# Patient Record
Sex: FEMALE | Race: WHITE | NOT HISPANIC OR LATINO | URBAN - METROPOLITAN AREA
[De-identification: names, ages, dates, MRNs, and addresses within clinical notes are randomized per-mention and may not be internally consistent; named-entity substitution may affect disease eponyms.]

---

## 2017-08-02 ENCOUNTER — INPATIENT (INPATIENT)
Facility: HOSPITAL | Age: 52
LOS: 1 days | Discharge: HOME | End: 2017-08-04
Attending: HOSPITALIST

## 2017-08-02 DIAGNOSIS — L03.119 CELLULITIS OF UNSPECIFIED PART OF LIMB: ICD-10-CM

## 2017-08-09 DIAGNOSIS — L03.115 CELLULITIS OF RIGHT LOWER LIMB: ICD-10-CM

## 2017-08-09 DIAGNOSIS — E87.6 HYPOKALEMIA: ICD-10-CM

## 2017-08-29 PROBLEM — Z00.00 ENCOUNTER FOR PREVENTIVE HEALTH EXAMINATION: Status: ACTIVE | Noted: 2017-08-29

## 2018-09-29 ENCOUNTER — EMERGENCY (EMERGENCY)
Facility: HOSPITAL | Age: 53
LOS: 0 days | Discharge: HOME | End: 2018-09-29
Attending: EMERGENCY MEDICINE | Admitting: EMERGENCY MEDICINE

## 2018-09-29 VITALS
SYSTOLIC BLOOD PRESSURE: 139 MMHG | HEART RATE: 77 BPM | RESPIRATION RATE: 16 BRPM | WEIGHT: 225.97 LBS | OXYGEN SATURATION: 97 % | HEIGHT: 62 IN | TEMPERATURE: 97 F | DIASTOLIC BLOOD PRESSURE: 87 MMHG

## 2018-09-29 DIAGNOSIS — Z98.891 HISTORY OF UTERINE SCAR FROM PREVIOUS SURGERY: ICD-10-CM

## 2018-09-29 DIAGNOSIS — Z98.891 HISTORY OF UTERINE SCAR FROM PREVIOUS SURGERY: Chronic | ICD-10-CM

## 2018-09-29 DIAGNOSIS — R04.0 EPISTAXIS: ICD-10-CM

## 2018-09-29 DIAGNOSIS — Z98.890 OTHER SPECIFIED POSTPROCEDURAL STATES: ICD-10-CM

## 2018-09-29 DIAGNOSIS — Z98.84 BARIATRIC SURGERY STATUS: Chronic | ICD-10-CM

## 2018-09-29 RX ORDER — PHENYLEPHRINE HCL 0.25 %
1 AEROSOL, SPRAY WITH PUMP (ML) NASAL ONCE
Qty: 0 | Refills: 0 | Status: COMPLETED | OUTPATIENT
Start: 2018-09-29 | End: 2018-09-29

## 2018-09-29 RX ADMIN — Medication 1 SPRAY(S): at 19:10

## 2018-09-29 NOTE — ED PROVIDER NOTE - PHYSICAL EXAMINATION
Physical Exam    Vital Signs: I have reviewed the initial vital signs.  Constitutional: well-nourished, appears stated age, no acute distress  Eyes: PERRLA, EOM intact, no conjunctival injection, and symmetrical lids.  ENT: Neck supple with no adenopathy, moist MM. + Bleeding from L. nostril oozing with no blood in posterior pharynx.  Cardiovascular: regular rate, regular rhythm, well-perfused extremities  Respiratory: unlabored respiratory effort, clear to auscultation bilaterally  Integumentary: warm, dry, no rash

## 2018-09-29 NOTE — ED PROVIDER NOTE - PROGRESS NOTE DETAILS
Cauterized bleeding vessel in left nostril Bleeding controlled no bleeding seen in L nostril on exam. Patient to be discharged from ED. Any available test results were discussed with patient and/or family. Verbal instructions given, including instructions to return to ED immediately for any new, worsening, or concerning symptoms. Patient endorsed understanding. Written discharge instructions additionally given, including follow-up plan.

## 2018-09-29 NOTE — ED PROVIDER NOTE - NS ED ROS FT
Review of Systems    Constitutional: (-) fever (-) weakness (-) diaphoresis   Eyes: (-) change in vision (-) photophobia (-) eye pain  ENT: (-) sore throat (-) ear ache  Cardiovascular: (-) chest pain  (-) palpitations  Respiratory: (-) SOB (-) cough   GI: (-) N/V   Integumentary: (-) rash (-) redness

## 2018-09-29 NOTE — ED PROVIDER NOTE - ATTENDING CONTRIBUTION TO CARE
left nostril bleeding, hx of nose bleeds, with cautery in past, no hx of trauma, bleeding started after blowing nose, there is constnat dripping, left nostril has fresh blood, clots removed, will use afrin spary and reassess nose to evaluate for cautery.

## 2018-09-29 NOTE — ED PROVIDER NOTE - OBJECTIVE STATEMENT
54 yo f reports w/ a h/o nose bleeds reports a nose bleed that began after pt sneezed 2 hr prior to arrival. Pt usually goes to ENT for cautery but today office was closed. Denies CP, palpitations, SOB, lightheadedness, trauma.     I have reviewed available current nursing and previous documentation of past medical, surgical, family, and/or social history.

## 2019-06-25 ENCOUNTER — INPATIENT (INPATIENT)
Facility: HOSPITAL | Age: 54
LOS: 0 days | Discharge: HOME | End: 2019-06-26
Attending: HOSPITALIST | Admitting: HOSPITALIST
Payer: COMMERCIAL

## 2019-06-25 VITALS
SYSTOLIC BLOOD PRESSURE: 127 MMHG | TEMPERATURE: 98 F | WEIGHT: 214.95 LBS | OXYGEN SATURATION: 98 % | DIASTOLIC BLOOD PRESSURE: 78 MMHG | HEART RATE: 105 BPM | RESPIRATION RATE: 105 BRPM

## 2019-06-25 DIAGNOSIS — Z98.891 HISTORY OF UTERINE SCAR FROM PREVIOUS SURGERY: Chronic | ICD-10-CM

## 2019-06-25 DIAGNOSIS — R00.2 PALPITATIONS: ICD-10-CM

## 2019-06-25 DIAGNOSIS — R07.9 CHEST PAIN, UNSPECIFIED: ICD-10-CM

## 2019-06-25 DIAGNOSIS — R01.1 CARDIAC MURMUR, UNSPECIFIED: ICD-10-CM

## 2019-06-25 DIAGNOSIS — E66.9 OBESITY, UNSPECIFIED: ICD-10-CM

## 2019-06-25 DIAGNOSIS — D64.9 ANEMIA, UNSPECIFIED: ICD-10-CM

## 2019-06-25 DIAGNOSIS — F41.9 ANXIETY DISORDER, UNSPECIFIED: ICD-10-CM

## 2019-06-25 DIAGNOSIS — Z98.84 BARIATRIC SURGERY STATUS: Chronic | ICD-10-CM

## 2019-06-25 LAB
ALBUMIN SERPL ELPH-MCNC: 3.9 G/DL — SIGNIFICANT CHANGE UP (ref 3.5–5.2)
ALBUMIN SERPL ELPH-MCNC: 4.1 G/DL — SIGNIFICANT CHANGE UP (ref 3.5–5.2)
ALP SERPL-CCNC: 104 U/L — SIGNIFICANT CHANGE UP (ref 30–115)
ALP SERPL-CCNC: 113 U/L — SIGNIFICANT CHANGE UP (ref 30–115)
ALT FLD-CCNC: 11 U/L — SIGNIFICANT CHANGE UP (ref 0–41)
ALT FLD-CCNC: 12 U/L — SIGNIFICANT CHANGE UP (ref 0–41)
ANION GAP SERPL CALC-SCNC: 13 MMOL/L — SIGNIFICANT CHANGE UP (ref 7–14)
ANION GAP SERPL CALC-SCNC: 13 MMOL/L — SIGNIFICANT CHANGE UP (ref 7–14)
AST SERPL-CCNC: 16 U/L — SIGNIFICANT CHANGE UP (ref 0–41)
AST SERPL-CCNC: 18 U/L — SIGNIFICANT CHANGE UP (ref 0–41)
BASOPHILS # BLD AUTO: 0.01 K/UL — SIGNIFICANT CHANGE UP (ref 0–0.2)
BASOPHILS NFR BLD AUTO: 0.2 % — SIGNIFICANT CHANGE UP (ref 0–1)
BILIRUB SERPL-MCNC: 0.5 MG/DL — SIGNIFICANT CHANGE UP (ref 0.2–1.2)
BILIRUB SERPL-MCNC: 0.6 MG/DL — SIGNIFICANT CHANGE UP (ref 0.2–1.2)
BLD GP AB SCN SERPL QL: SIGNIFICANT CHANGE UP
BUN SERPL-MCNC: 14 MG/DL — SIGNIFICANT CHANGE UP (ref 10–20)
BUN SERPL-MCNC: 14 MG/DL — SIGNIFICANT CHANGE UP (ref 10–20)
CALCIUM SERPL-MCNC: 9.1 MG/DL — SIGNIFICANT CHANGE UP (ref 8.5–10.1)
CALCIUM SERPL-MCNC: 9.3 MG/DL — SIGNIFICANT CHANGE UP (ref 8.5–10.1)
CHLORIDE SERPL-SCNC: 102 MMOL/L — SIGNIFICANT CHANGE UP (ref 98–110)
CHLORIDE SERPL-SCNC: 103 MMOL/L — SIGNIFICANT CHANGE UP (ref 98–110)
CK MB CFR SERPL CALC: 1.8 NG/ML — SIGNIFICANT CHANGE UP (ref 0.6–6.3)
CK MB CFR SERPL CALC: 2.1 NG/ML — SIGNIFICANT CHANGE UP (ref 0.6–6.3)
CK SERPL-CCNC: 38 U/L — SIGNIFICANT CHANGE UP (ref 0–225)
CK SERPL-CCNC: 41 U/L — SIGNIFICANT CHANGE UP (ref 0–225)
CO2 SERPL-SCNC: 25 MMOL/L — SIGNIFICANT CHANGE UP (ref 17–32)
CO2 SERPL-SCNC: 26 MMOL/L — SIGNIFICANT CHANGE UP (ref 17–32)
CREAT SERPL-MCNC: 0.5 MG/DL — LOW (ref 0.7–1.5)
CREAT SERPL-MCNC: 0.5 MG/DL — LOW (ref 0.7–1.5)
EOSINOPHIL # BLD AUTO: 0.06 K/UL — SIGNIFICANT CHANGE UP (ref 0–0.7)
EOSINOPHIL NFR BLD AUTO: 1.1 % — SIGNIFICANT CHANGE UP (ref 0–8)
GLUCOSE SERPL-MCNC: 87 MG/DL — SIGNIFICANT CHANGE UP (ref 70–99)
GLUCOSE SERPL-MCNC: 98 MG/DL — SIGNIFICANT CHANGE UP (ref 70–99)
HCT VFR BLD CALC: 25.9 % — LOW (ref 37–47)
HCT VFR BLD CALC: 40.7 % — SIGNIFICANT CHANGE UP (ref 37–47)
HCT VFR BLD CALC: 42.8 % — SIGNIFICANT CHANGE UP (ref 37–47)
HGB BLD-MCNC: 13.5 G/DL — SIGNIFICANT CHANGE UP (ref 12–16)
HGB BLD-MCNC: 13.8 G/DL — SIGNIFICANT CHANGE UP (ref 12–16)
HGB BLD-MCNC: 8.6 G/DL — LOW (ref 12–16)
IMM GRANULOCYTES NFR BLD AUTO: 0.2 % — SIGNIFICANT CHANGE UP (ref 0.1–0.3)
IRON SATN MFR SERPL: 23 % — SIGNIFICANT CHANGE UP (ref 15–50)
IRON SATN MFR SERPL: 83 UG/DL — SIGNIFICANT CHANGE UP (ref 35–150)
LYMPHOCYTES # BLD AUTO: 1.51 K/UL — SIGNIFICANT CHANGE UP (ref 1.2–3.4)
LYMPHOCYTES # BLD AUTO: 28.1 % — SIGNIFICANT CHANGE UP (ref 20.5–51.1)
MCHC RBC-ENTMCNC: 28.8 PG — SIGNIFICANT CHANGE UP (ref 27–31)
MCHC RBC-ENTMCNC: 29.7 PG — SIGNIFICANT CHANGE UP (ref 27–31)
MCHC RBC-ENTMCNC: 32.2 G/DL — SIGNIFICANT CHANGE UP (ref 32–37)
MCHC RBC-ENTMCNC: 33.2 G/DL — SIGNIFICANT CHANGE UP (ref 32–37)
MCV RBC AUTO: 89.2 FL — SIGNIFICANT CHANGE UP (ref 81–99)
MCV RBC AUTO: 89.3 FL — SIGNIFICANT CHANGE UP (ref 81–99)
MONOCYTES # BLD AUTO: 0.27 K/UL — SIGNIFICANT CHANGE UP (ref 0.1–0.6)
MONOCYTES NFR BLD AUTO: 5 % — SIGNIFICANT CHANGE UP (ref 1.7–9.3)
NEUTROPHILS # BLD AUTO: 3.51 K/UL — SIGNIFICANT CHANGE UP (ref 1.4–6.5)
NEUTROPHILS NFR BLD AUTO: 65.4 % — SIGNIFICANT CHANGE UP (ref 42.2–75.2)
NRBC # BLD: 0 /100 WBCS — SIGNIFICANT CHANGE UP (ref 0–0)
NRBC # BLD: 0 /100 WBCS — SIGNIFICANT CHANGE UP (ref 0–0)
PLATELET # BLD AUTO: 166 K/UL — SIGNIFICANT CHANGE UP (ref 130–400)
PLATELET # BLD AUTO: 275 K/UL — SIGNIFICANT CHANGE UP (ref 130–400)
POTASSIUM SERPL-MCNC: 3.5 MMOL/L — SIGNIFICANT CHANGE UP (ref 3.5–5)
POTASSIUM SERPL-MCNC: 4.1 MMOL/L — SIGNIFICANT CHANGE UP (ref 3.5–5)
POTASSIUM SERPL-SCNC: 3.5 MMOL/L — SIGNIFICANT CHANGE UP (ref 3.5–5)
POTASSIUM SERPL-SCNC: 4.1 MMOL/L — SIGNIFICANT CHANGE UP (ref 3.5–5)
PROT SERPL-MCNC: 6.1 G/DL — SIGNIFICANT CHANGE UP (ref 6–8)
PROT SERPL-MCNC: 6.5 G/DL — SIGNIFICANT CHANGE UP (ref 6–8)
RBC # BLD: 2.9 M/UL — LOW (ref 4.2–5.4)
RBC # BLD: 4.8 M/UL — SIGNIFICANT CHANGE UP (ref 4.2–5.4)
RBC # BLD: 4.8 M/UL — SIGNIFICANT CHANGE UP (ref 4.2–5.4)
RBC # FLD: 13.4 % — SIGNIFICANT CHANGE UP (ref 11.5–14.5)
RBC # FLD: 13.7 % — SIGNIFICANT CHANGE UP (ref 11.5–14.5)
RETICS #: 67.2 K/UL — SIGNIFICANT CHANGE UP (ref 25–125)
RETICS/RBC NFR: 1.4 % — SIGNIFICANT CHANGE UP (ref 0.5–1.5)
SODIUM SERPL-SCNC: 141 MMOL/L — SIGNIFICANT CHANGE UP (ref 135–146)
SODIUM SERPL-SCNC: 141 MMOL/L — SIGNIFICANT CHANGE UP (ref 135–146)
TIBC SERPL-MCNC: 355 UG/DL — SIGNIFICANT CHANGE UP (ref 220–430)
TROPONIN T SERPL-MCNC: <0.01 NG/ML — SIGNIFICANT CHANGE UP
UIBC SERPL-MCNC: 272 UG/DL — SIGNIFICANT CHANGE UP (ref 110–370)
WBC # BLD: 5.37 K/UL — SIGNIFICANT CHANGE UP (ref 4.8–10.8)
WBC # BLD: 9.06 K/UL — SIGNIFICANT CHANGE UP (ref 4.8–10.8)
WBC # FLD AUTO: 5.37 K/UL — SIGNIFICANT CHANGE UP (ref 4.8–10.8)
WBC # FLD AUTO: 9.06 K/UL — SIGNIFICANT CHANGE UP (ref 4.8–10.8)

## 2019-06-25 PROCEDURE — 71046 X-RAY EXAM CHEST 2 VIEWS: CPT | Mod: 26

## 2019-06-25 PROCEDURE — 99285 EMERGENCY DEPT VISIT HI MDM: CPT | Mod: 25

## 2019-06-25 PROCEDURE — 93970 EXTREMITY STUDY: CPT | Mod: 26

## 2019-06-25 PROCEDURE — 99223 1ST HOSP IP/OBS HIGH 75: CPT | Mod: AI

## 2019-06-25 RX ORDER — NYSTATIN CREAM 100000 [USP'U]/G
1 CREAM TOPICAL
Refills: 0 | Status: DISCONTINUED | OUTPATIENT
Start: 2019-06-25 | End: 2019-06-26

## 2019-06-25 RX ORDER — SERTRALINE 25 MG/1
25 TABLET, FILM COATED ORAL DAILY
Refills: 0 | Status: DISCONTINUED | OUTPATIENT
Start: 2019-06-25 | End: 2019-06-25

## 2019-06-25 RX ORDER — HYDROXYZINE HCL 10 MG
25 TABLET ORAL AT BEDTIME
Refills: 0 | Status: DISCONTINUED | OUTPATIENT
Start: 2019-06-25 | End: 2019-06-26

## 2019-06-25 RX ORDER — SERTRALINE 25 MG/1
50 TABLET, FILM COATED ORAL
Qty: 0 | Refills: 0 | DISCHARGE

## 2019-06-25 RX ORDER — SERTRALINE 25 MG/1
0 TABLET, FILM COATED ORAL
Qty: 0 | Refills: 0 | DISCHARGE

## 2019-06-25 RX ADMIN — Medication 25 MILLIGRAM(S): at 21:25

## 2019-06-25 RX ADMIN — NYSTATIN CREAM 1 APPLICATION(S): 100000 CREAM TOPICAL at 17:35

## 2019-06-25 NOTE — H&P ADULT - NSHPLABSRESULTS_GEN_ALL_CORE
8.6    5.37  )-----------( 166      ( 25 Jun 2019 04:30 )             25.9     06-25    141  |  103  |  14  ----------------------------<  98  3.5   |  25  |  0.5<L>    Ca    9.1      25 Jun 2019 04:30    TPro  6.1  /  Alb  3.9  /  TBili  0.6  /  DBili  x   /  AST  16  /  ALT  11  /  AlkPhos  104  06-25              Lactate Trend    CARDIAC MARKERS ( 25 Jun 2019 03:55 )  x     / <0.01 ng/mL / x     / x     / x          CAPILLARY BLOOD GLUCOSE 8.6    5.37  )-----------( 166      ( 25 Jun 2019 04:30 )             25.9     06-25    141  |  103  |  14  ----------------------------<  98  3.5   |  25  |  0.5<L>    Ca    9.1      25 Jun 2019 04:30    TPro  6.1  /  Alb  3.9  /  TBili  0.6  /  DBili  x   /  AST  16  /  ALT  11  /  AlkPhos  104  06-25              Lactate Trend    CARDIAC MARKERS ( 25 Jun 2019 03:55 )  x     / <0.01 ng/mL / x     / x     / x          EKG - NSR, POSSIBLE LAE, LVH

## 2019-06-25 NOTE — H&P ADULT - PROBLEM SELECTOR PLAN 1
Due to the fact that this is second hospitalization within a few days for same issue, patient is informed Due to the fact that this is second hospitalization within a few days for same issue (and mentions to both ER staff and myself that her troponins were elevated at Health system,  patient is informed that at least 24 hours of observation will be needed. For now cardiac monitor, serial cardiac enzymes and Echo

## 2019-06-25 NOTE — H&P ADULT - HISTORY OF PRESENT ILLNESS
54yo female presents to the ER due to palpitations which awoke her from sleep. Associated with a "faint" pain under left breast. She denies fevers, cough or shortness of breath. Actually has had similar episodes for several months but has gotten worse over last 3 days. She was admitted for this problem in [facility] and discharged yesterday after series of 3 negative cardiac enzymes and a normal thyroid level (as per patient) She was told they could not do an ECHO however due to scheduling problem. Patient denies alcohol or tobacco use but notes there is lot of stress at home. She has a murmur 52yo female presents to the ER due to palpitations which awoke her from sleep. Associated with a "faint" pain under left breast. She denies fevers, cough or shortness of breath. Actually has had similar episodes for several months but has gotten worse over last 3 days. She was admitted for this problem in [facility] and discharged yesterday after series of 3 "elevated" cardiac enzyme levels but a normal thyroid level (as per patient) She was told they could not do an ECHO however due to scheduling problem. Patient denies alcohol or tobacco use but notes there is lot of stress at home. She has history of murmur but she denies any history of anemia (says she was not told of being anemic in [place] 54yo female presents to the ER due to palpitations which awoke her from sleep. Associated with a "faint" pain under left breast. She denies fevers, cough or shortness of breath. Actually has had similar episodes for several months but has gotten worse over last 3 days. She was admitted for this problem in [facility] and discharged yesterday after series of 3 "elevated" cardiac enzyme levels but a normal thyroid level (as per patient) She was told they could not do an ECHO however due to scheduling problem. Patient denies alcohol or tobacco use but notes there is lot of stress at home. She has history of murmur but she denies any history of anemia (says she was not told of being anemic in [place]. She stilll has her period but denies heavy bleeding or blood per rectum or urine 54yo female presents to the ER due to palpitations which awoke her from sleep. Associated with a "faint" pain under left breast. She denies fevers, cough or shortness of breath but reports some weakness. Actually has had similar episodes for several months but has gotten worse over last 3 days. She was admitted for this problem in [facility] and discharged yesterday after series of 3 "elevated" cardiac enzyme levels but a normal thyroid level (as per patient) She was told they could not do an ECHO however due to scheduling problem. Patient denies alcohol or tobacco use but notes there is lot of stress at home. She has history of murmur but she denies any history of anemia (says she was not told of being anemic in [place]. She stilll has her period but denies heavy bleeding or blood per rectum or urine

## 2019-06-25 NOTE — H&P ADULT - PROBLEM SELECTOR PLAN 4
As mentioned above, patient not aware of this being present in the past but does not seem acute (no active bleeding). Anemia work up ordered - per patient request release of information form completed by myself to be forwarded to Elis Quinn for CBC (and cardiac) results. I asked Medical PA to forward this request

## 2019-06-25 NOTE — ED PROVIDER NOTE - OBJECTIVE STATEMENT
53 year old female comes to emergency room for chest pain and palpitations. patient states that she was under a lot of stress over the last week and was in [place] visiting family and was having chest pain and was seen at [facility] and was admitted for elevated troponin. patient states she was suppose to get ECHO but was unable to get as in patient and left hospital. patient states tonight had another episode of chest pain and palpations and came to emergency room. denies shortness of breath, leg pain and swelling, no recent travel.

## 2019-06-25 NOTE — PATIENT PROFILE ADULT - ANY IMPAIRED UPPER EXTREMITY FUNCTION WITHIN A WEEK PRIOR TO ADMISSION RELATED TO?
Lisinopril 20 is increased to 40 mg daily    NEW rx for blood pressure  Is hydrochlorothiazide aka HCTZ 25 mg one daily    Watch for light headedness when standing from a sitting or a laying position  
no

## 2019-06-25 NOTE — ED PROVIDER NOTE - CLINICAL SUMMARY MEDICAL DECISION MAKING FREE TEXT BOX
Pt pw palpiations over  few months and  pressure -  no pe risk factors  low heart score - pt says was in a [facility] yesterday had positiive "troponins" -  they were unable to get echo and sent home -  Pt with recurrence of palpitations.  trop negative here  ekg no ischemia - pt with  + murmur on exam  .  admitted for further eval

## 2019-06-25 NOTE — ED ADULT TRIAGE NOTE - CHIEF COMPLAINT QUOTE
chest pain over the past few months, pt indicates they were in [facility] yesterday and admitted for elevated trops x 3.

## 2019-06-26 ENCOUNTER — TRANSCRIPTION ENCOUNTER (OUTPATIENT)
Age: 54
End: 2019-06-26

## 2019-06-26 VITALS
SYSTOLIC BLOOD PRESSURE: 98 MMHG | TEMPERATURE: 98 F | RESPIRATION RATE: 16 BRPM | HEART RATE: 84 BPM | DIASTOLIC BLOOD PRESSURE: 57 MMHG

## 2019-06-26 LAB
ANION GAP SERPL CALC-SCNC: 10 MMOL/L — SIGNIFICANT CHANGE UP (ref 7–14)
BUN SERPL-MCNC: 13 MG/DL — SIGNIFICANT CHANGE UP (ref 10–20)
CALCIUM SERPL-MCNC: 8.9 MG/DL — SIGNIFICANT CHANGE UP (ref 8.5–10.1)
CHLORIDE SERPL-SCNC: 104 MMOL/L — SIGNIFICANT CHANGE UP (ref 98–110)
CO2 SERPL-SCNC: 27 MMOL/L — SIGNIFICANT CHANGE UP (ref 17–32)
CREAT SERPL-MCNC: <0.5 MG/DL — LOW (ref 0.7–1.5)
FERRITIN SERPL-MCNC: 43 NG/ML — SIGNIFICANT CHANGE UP (ref 15–150)
FOLATE SERPL-MCNC: >20 NG/ML — SIGNIFICANT CHANGE UP
GLUCOSE SERPL-MCNC: 83 MG/DL — SIGNIFICANT CHANGE UP (ref 70–99)
HCT VFR BLD CALC: 40.3 % — SIGNIFICANT CHANGE UP (ref 37–47)
HCV AB S/CO SERPL IA: 0.07 S/CO — SIGNIFICANT CHANGE UP (ref 0–0.99)
HCV AB SERPL-IMP: SIGNIFICANT CHANGE UP
HGB BLD-MCNC: 13.2 G/DL — SIGNIFICANT CHANGE UP (ref 12–16)
MCHC RBC-ENTMCNC: 29.1 PG — SIGNIFICANT CHANGE UP (ref 27–31)
MCHC RBC-ENTMCNC: 32.8 G/DL — SIGNIFICANT CHANGE UP (ref 32–37)
MCV RBC AUTO: 88.8 FL — SIGNIFICANT CHANGE UP (ref 81–99)
NRBC # BLD: 0 /100 WBCS — SIGNIFICANT CHANGE UP (ref 0–0)
PLATELET # BLD AUTO: 240 K/UL — SIGNIFICANT CHANGE UP (ref 130–400)
POTASSIUM SERPL-MCNC: 3.9 MMOL/L — SIGNIFICANT CHANGE UP (ref 3.5–5)
POTASSIUM SERPL-SCNC: 3.9 MMOL/L — SIGNIFICANT CHANGE UP (ref 3.5–5)
RBC # BLD: 4.54 M/UL — SIGNIFICANT CHANGE UP (ref 4.2–5.4)
RBC # FLD: 13.6 % — SIGNIFICANT CHANGE UP (ref 11.5–14.5)
SODIUM SERPL-SCNC: 141 MMOL/L — SIGNIFICANT CHANGE UP (ref 135–146)
TSH SERPL-MCNC: 1.4 UIU/ML — SIGNIFICANT CHANGE UP (ref 0.27–4.2)
VIT B12 SERPL-MCNC: 1724 PG/ML — HIGH (ref 232–1245)
WBC # BLD: 7.57 K/UL — SIGNIFICANT CHANGE UP (ref 4.8–10.8)
WBC # FLD AUTO: 7.57 K/UL — SIGNIFICANT CHANGE UP (ref 4.8–10.8)

## 2019-06-26 PROCEDURE — 99239 HOSP IP/OBS DSCHRG MGMT >30: CPT

## 2019-06-26 RX ORDER — NYSTATIN CREAM 100000 [USP'U]/G
1 CREAM TOPICAL
Qty: 60 | Refills: 0
Start: 2019-06-26 | End: 2019-07-05

## 2019-06-26 RX ADMIN — NYSTATIN CREAM 1 APPLICATION(S): 100000 CREAM TOPICAL at 05:20

## 2019-06-26 NOTE — DISCHARGE NOTE NURSING/CASE MANAGEMENT/SOCIAL WORK - NSDCDPATPORTLINK_GEN_ALL_CORE
You can access the [facility] Patient Portal, offered by [facility], by registering with the following website: [email or web address]

## 2019-06-26 NOTE — DISCHARGE NOTE PROVIDER - HOSPITAL COURSE
54yo female presents to the ER due to palpitations which awoke her from sleep. Associated with a "faint" pain under left breast.  She is very anxious.  She does take zoloft prior to menstruation for PMS.  She had serial negative trops and a normal ECHO.  She was wanting to speak with a counsellor prior to discharge due to issues at home.          #chest pain, non-cardiac    #anxiety        medically stable for discharge to home    f/u PMD within 1 week                T(F): 98.3 (06-26-19 @ 05:11), Max: 98.3 (06-26-19 @ 05:11)    HR: 84 (06-26-19 @ 05:11)    BP: 98/57 (06-26-19 @ 05:11) (98/57 - 124/58)    RR: 16 (06-26-19 @ 05:11)    SpO2: 98% (06-25-19 @ 13:25)        GENERAL: NAD    HEENT: MMM    CHEST/LUNG: Good air entry, No wheezing    HEART: RRR, No murmurs    ABDOMEN: Soft, Bowel sounds present    EXTREMITIES:  no edema    NEURO: AOx3                                13.2     7.57  )-----------( 240      ( 26 Jun 2019 06:31 )               40.3         06-26        141  |  104  |  13    ----------------------------<  83    3.9   |  27  |  <0.5<L>        Ca    8.9      26 Jun 2019 06:31        TPro  6.5  /  Alb  4.1  /  TBili  0.5  /  DBili  x   /  AST  18  /  ALT  12  /  AlkPhos  113  06-25                    This is only a brief summary of the pt's hospital course.  Further details outlined in the EMR.        Total time:  45 min 52yo female presents to the ER due to palpitations which awoke her from sleep. Associated with a "faint" pain under left breast.  She is very anxious.  She does take zoloft prior to menstruation for PMS.  She had serial negative trops and a normal ECHO.  She was wanting to speak with a counsellor prior to discharge due to issues at home.          #chest pain/palpitations, likely related to anxiety    #anxiety        medically stable for discharge to home    f/u PMD within 1 week                T(F): 98.3 (06-26-19 @ 05:11), Max: 98.3 (06-26-19 @ 05:11)    HR: 84 (06-26-19 @ 05:11)    BP: 98/57 (06-26-19 @ 05:11) (98/57 - 124/58)    RR: 16 (06-26-19 @ 05:11)    SpO2: 98% (06-25-19 @ 13:25)        GENERAL: NAD    HEENT: MMM    CHEST/LUNG: Good air entry, No wheezing    HEART: RRR, No murmurs    ABDOMEN: Soft, Bowel sounds present    EXTREMITIES:  no edema    NEURO: AOx3                                13.2     7.57  )-----------( 240      ( 26 Jun 2019 06:31 )               40.3         06-26        141  |  104  |  13    ----------------------------<  83    3.9   |  27  |  <0.5<L>        Ca    8.9      26 Jun 2019 06:31        TPro  6.5  /  Alb  4.1  /  TBili  0.5  /  DBili  x   /  AST  18  /  ALT  12  /  AlkPhos  113  06-25                    This is only a brief summary of the pt's hospital course.  Further details outlined in the EMR.        Total time:  45 min

## 2021-06-12 NOTE — ED ADULT NURSE NOTE - NSFALLRSKASSESSTYPE_ED_ALL_ED
Pt stated rx had been sent to [facility] in September because she was out, and now needs refill to be sent to [facility] but they need more information. Unable to reach anyone at [facility] to further discuss. Please try again.    Initial (On Arrival)

## 2022-02-17 NOTE — ED PROVIDER NOTE - PHYSICAL EXAMINATION
Class IV (difficult) - the soft palate is not visible at all
Physical Exam    Vital Signs: I have reviewed the initial vital signs.  Constitutional: well-nourished, appears stated age, no acute distress  Eyes: Conjunctiva pink, Sclera clear, PERRLA, EOMI.  Cardiovascular: S1 and S2, regular rate, regular rhythm, well-perfused extremities, radial pulses equal and 2+  Respiratory: unlabored respiratory effort, clear to auscultation bilaterally no wheezing, rales and rhonchi  Gastrointestinal: soft, non-tender abdomen, no pulsatile mass, normal bowl sounds  Musculoskeletal: supple neck, no lower extremity edema, no midline tenderness  Integumentary: warm, dry, no rash  Neurologic: awake, alert, cranial nerves II-XII grossly intact, extremities’ motor and sensory functions grossly intact  Psychiatric: appropriate mood, appropriate affect